# Patient Record
Sex: FEMALE | Race: WHITE | URBAN - METROPOLITAN AREA
[De-identification: names, ages, dates, MRNs, and addresses within clinical notes are randomized per-mention and may not be internally consistent; named-entity substitution may affect disease eponyms.]

---

## 2019-08-12 ENCOUNTER — OUTPATIENT (OUTPATIENT)
Dept: OUTPATIENT SERVICES | Facility: HOSPITAL | Age: 59
LOS: 1 days | End: 2019-08-12

## 2019-08-12 ENCOUNTER — APPOINTMENT (OUTPATIENT)
Dept: OPHTHALMOLOGY | Facility: CLINIC | Age: 59
End: 2019-08-12

## 2019-08-12 PROBLEM — Z00.00 ENCOUNTER FOR PREVENTIVE HEALTH EXAMINATION: Status: ACTIVE | Noted: 2019-08-12

## 2019-08-13 DIAGNOSIS — H40.003 PREGLAUCOMA, UNSPECIFIED, BILATERAL: ICD-10-CM

## 2022-06-23 ENCOUNTER — APPOINTMENT (OUTPATIENT)
Dept: UROLOGY | Facility: CLINIC | Age: 62
End: 2022-06-23
Payer: COMMERCIAL

## 2022-06-23 VITALS
HEART RATE: 106 BPM | DIASTOLIC BLOOD PRESSURE: 62 MMHG | OXYGEN SATURATION: 96 % | TEMPERATURE: 97.4 F | BODY MASS INDEX: 38.41 KG/M2 | SYSTOLIC BLOOD PRESSURE: 124 MMHG | WEIGHT: 225 LBS | HEIGHT: 64 IN

## 2022-06-23 PROCEDURE — 99203 OFFICE O/P NEW LOW 30 MIN: CPT

## 2022-07-11 ENCOUNTER — APPOINTMENT (OUTPATIENT)
Dept: UROLOGY | Facility: CLINIC | Age: 62
End: 2022-07-11

## 2022-07-12 ENCOUNTER — APPOINTMENT (OUTPATIENT)
Dept: UROLOGY | Facility: CLINIC | Age: 62
End: 2022-07-12

## 2022-07-12 NOTE — ADDENDUM
[FreeTextEntry1] : 6/8/2022: Trace blood and LE, 0-5 WBC, 0 RBC, No epi, bacteria, A1c 5.5%\par 6/16/2022: UA micro 0-2 RBC, No WBC, 0-5 Epi, No bacteria, Trace blood, NEg NIT/LE\par \par Meds per med recs received from June 2022\par Azelastine, Cyclobenzaprine, Cevimeline, Famotidine, Hydroxychloroquine, Levothyroxine, Losartan, Metformin, Mupirocin cream, Naproxen, Omeprazole

## 2022-07-12 NOTE — HISTORY OF PRESENT ILLNESS
[FreeTextEntry1] : Language: English\par Date of First visit: 6/23/2022\par Accompanied by: Self\par Contact info:  \[facility] Referring Provider/PCP: Ramonita Love\[facility] fax: [phone]\par \par \par \par CC/ Problem List:\[facility] \par ===============================================================================\par FIRST VISIT:\par The patient is a 61 year female who first presents 06/23/2022 for microhematuria. She has never seen hematuria. She has no h/o smoking, chemical exposure, chemo, RT, schisto exposure, narcotic abuse. She has no h/o  surgery or trauma.\par \par Dr. Love thinks she has IC. Once or twice a week she has "the symptoms of a UTI" and then they go away on their own. She does have Sjogrens. When she has a flare she gets frequency with small volume voids. She denies dysuria but she she feels "chafed" down there. She thinks her symptoms started around 2020. \par \par She has constipation. She is not sexually active. She sees a gyn regularly. She has a uterine cyst. \par She has never been tested for ANTOINETTE and may snore. She is often tired during the day.\par \par -------------------------------------------------------------------------------------------\par INTERVAL VISITS:\par \par \par ===============================================================================\par \par PMH: Sjogrens, Lymphedema, Hiatal hernia, IBS-C, pre-DM\par PSH: Dermoid cyst removal 1987\par POBH: (if applicable)\par FH: \par \par ALL: NKDA\par MEDS: Metformin, Hydroxychloroquine. Cevimeline, Losartan, Levothyroxine, Pantoprazole, Rosuvastatin, Famotidine\par SOC: Denies Tob, EtoH, Drugs\par \par \par ROS: Review of Systems is as per HPI unless otherwise denoted below\par \par \par ===============================================================================\par DATA: \par \par LABS:-------------------------------------------------------------------------------------------------------------------\par 6/7/2022: UA Neg glucoseTrace occult bloodk, 0 RBC, 0-5 WBC, Trace LE\par 6/9/2022: UA trace occuld blood, 0-2 RBC no WBC, 0-5 Epi\par \par \par RADS:-------------------------------------------------------------------------------------------------------------------\par \par \par \par PATHOLOGY/CYTOLOGY:-------------------------------------------------------------------------------------------\par \par \par \par VOIDING STUDIES: ----------------------------------------------------------------------------------------------------\par \par \par \par STONE STUDIES: (Analysis/LLSA)----------------------------------------------------------------------------------\par \par \par \par PROCEDURES: -----------------------------------------------------------------------------------------------\par \par \par \par \par ===============================================================================\par \par PHYSICAL EXAM:\par \par GEN: AAOx3, NAD, Habitus: OBII\par \par BARRIERS to CARE: none\par \par PSYCH: Appropriate Behavior, Affect Congruent\par \par HEENT: AT/NC Trachea midline. EOMI.\par \par Lungs: No labored breathing.\par \par NEURO: + Movement, all 4 extremities grossly intact without deficits. No tremors.\par \par SKIN: Warm dry. No visible rashes or ulcers\par \par GAIT: Gait normal, Stability good\par \par \par =======================================================================================\par \par \par \par ASSESSMENT and PLAN\par \par The patient is a 61 year female with a history of the following:\par \par 1. microhematuria:\par Microhematuria is determined by UA micro not a dip and both of her micro's were negative. I reassured her.\par \par 2. Intermittent urinary frequency\par we discussed bladder irritants like caffeine, ETOH, spicy and acidic foods. Other things that can impact voiding are constipation, lack of sleep, allergy/cold, anxiety meds, sexual activity and activity. She is going to track whether these are triggers for her. \par \par She will see Dr. Cabrera when Dr. Cabrera is available\par \par 3. Fatigue\par She will also consider going for ANTOINETTE testing. \par \par \par \par \par -----------------------------------------------------------------------------------------------------\par LABS/TESTS Ordered:\par Meds Ordered:\par Follow up: Dr Cabrera next available, consider ANTOINETTE testing\par -----------------------------------------------------------------------------------------------------\par \par Greater than 50% of this 40 minute visit was spent counseling the patient and coordinating care.\par \par \par Thank you for allowing me to assist in the care of your patient. Should you have any questions please do not hesitate to reach out to me.\par \par \par Narda Huizar MD\par Associate \[facility] Department of Urology\[facility]\par Phone: [phone]\[facility] Fax: [phone]\[facility] \par [address]\[facility] [address]\[facility]